# Patient Record
Sex: FEMALE | Race: WHITE | URBAN - METROPOLITAN AREA
[De-identification: names, ages, dates, MRNs, and addresses within clinical notes are randomized per-mention and may not be internally consistent; named-entity substitution may affect disease eponyms.]

---

## 2017-12-05 ENCOUNTER — PATIENT MESSAGE (OUTPATIENT)
Dept: FAMILY MEDICINE CLINIC | Facility: CLINIC | Age: 27
End: 2017-12-05

## 2017-12-05 ENCOUNTER — LAB ENCOUNTER (OUTPATIENT)
Dept: LAB | Age: 27
End: 2017-12-05
Attending: FAMILY MEDICINE
Payer: COMMERCIAL

## 2017-12-05 ENCOUNTER — OFFICE VISIT (OUTPATIENT)
Dept: FAMILY MEDICINE CLINIC | Facility: CLINIC | Age: 27
End: 2017-12-05

## 2017-12-05 VITALS
BODY MASS INDEX: 25.89 KG/M2 | HEART RATE: 73 BPM | DIASTOLIC BLOOD PRESSURE: 88 MMHG | SYSTOLIC BLOOD PRESSURE: 135 MMHG | TEMPERATURE: 98 F | HEIGHT: 65 IN | RESPIRATION RATE: 14 BRPM | WEIGHT: 155.38 LBS

## 2017-12-05 DIAGNOSIS — Z01.419 WELL WOMAN EXAM WITH ROUTINE GYNECOLOGICAL EXAM: Primary | ICD-10-CM

## 2017-12-05 DIAGNOSIS — Z01.419 WELL WOMAN EXAM WITH ROUTINE GYNECOLOGICAL EXAM: ICD-10-CM

## 2017-12-05 DIAGNOSIS — Z30.011 ENCOUNTER FOR INITIAL PRESCRIPTION OF CONTRACEPTIVE PILLS: ICD-10-CM

## 2017-12-05 PROCEDURE — 86780 TREPONEMA PALLIDUM: CPT

## 2017-12-05 PROCEDURE — 87491 CHLMYD TRACH DNA AMP PROBE: CPT

## 2017-12-05 PROCEDURE — 87389 HIV-1 AG W/HIV-1&-2 AB AG IA: CPT

## 2017-12-05 PROCEDURE — 99395 PREV VISIT EST AGE 18-39: CPT | Performed by: FAMILY MEDICINE

## 2017-12-05 PROCEDURE — 85025 COMPLETE CBC W/AUTO DIFF WBC: CPT

## 2017-12-05 PROCEDURE — 80061 LIPID PANEL: CPT

## 2017-12-05 PROCEDURE — 87591 N.GONORRHOEAE DNA AMP PROB: CPT

## 2017-12-05 PROCEDURE — 36415 COLL VENOUS BLD VENIPUNCTURE: CPT

## 2017-12-05 PROCEDURE — 80053 COMPREHEN METABOLIC PANEL: CPT

## 2017-12-05 PROCEDURE — 84443 ASSAY THYROID STIM HORMONE: CPT

## 2017-12-05 RX ORDER — NORETHINDRONE ACETATE AND ETHINYL ESTRADIOL .03; 1.5 MG/1; MG/1
1 TABLET ORAL DAILY
Qty: 1 PACKAGE | Refills: 0 | Status: SHIPPED | OUTPATIENT
Start: 2017-12-05 | End: 2017-12-27

## 2017-12-05 NOTE — PROGRESS NOTES
HPI:    Quynh Pineda is a 28year old female presents to clinic for a well woman exam.   Past Medical history: none, no meds  Patient's last menstrual period was 11/15/2017. 3-4/28-32 day cycles, no concerns  Sexually Active?  Yes with 1 new partner, normal.   Mouth/Throat: Uvula is midline, oropharynx is clear and moist and mucous membranes are normal.   Eyes: Conjunctivae and EOM are normal. Pupils are equal, round, and reactive to light. Neck: Normal range of motion. Neck supple.  No thyromegaly pr 12/5/2017  Kenny Lewis MD

## 2017-12-06 NOTE — TELEPHONE ENCOUNTER
ISH message to pt sent with Dr Jorge Chaudhary recommendations to use backup method of birth control (condoms with spermicide) during the first 7 days of the pill package.  Birth control pills will start to protect you from pregnancy after the first week, if

## 2017-12-06 NOTE — TELEPHONE ENCOUNTER
From: Yasmin Patterson  To: Mamadou Noguera MD  Sent: 12/5/2017 10:55 AM CST  Subject: Prescription Question    Does the birth control go in effect right away? Or is it after a few days?

## 2017-12-06 NOTE — TELEPHONE ENCOUNTER
From: Viviana Mariscal  To: Jonas Mcmillan MD  Sent: 12/5/2017 9:55 PM CST  Subject: Non-Urgent Medical Question    How quickly does the birth control go into effect?

## 2017-12-27 RX ORDER — NORETHINDRONE ACETATE AND ETHINYL ESTRADIOL .03; 1.5 MG/1; MG/1
1 TABLET ORAL DAILY
Qty: 1 PACKAGE | Refills: 0 | Status: SHIPPED
Start: 2017-12-27 | End: 2018-01-18

## 2017-12-27 NOTE — TELEPHONE ENCOUNTER
Gynecology Medications please advise on contraceptive refill request. Protocol passed but notes from [address] states that pt was advised to quit smoking.      Please respond to pool: EM FM OPO LPN/CMA    Pt contacted (Name and  verified) and states yo

## 2018-01-19 RX ORDER — NORETHINDRONE ACETATE AND ETHINYL ESTRADIOL .03; 1.5 MG/1; MG/1
1 TABLET ORAL DAILY
Qty: 1 PACKAGE | Refills: 0
Start: 2018-01-19 | End: 2018-02-16

## 2018-01-20 RX ORDER — NORETHINDRONE ACETATE AND ETHINYL ESTRADIOL .03; 1.5 MG/1; MG/1
1 TABLET ORAL DAILY
Qty: 1 PACKAGE | Refills: 2 | Status: SHIPPED
Start: 2018-01-20 | End: 2018-02-17

## 2018-01-20 NOTE — TELEPHONE ENCOUNTER
From: Sergey Sabillon  Sent: 1/18/2018 5:30 AM CST  Subject: Medication Renewal Request    Sergey Sabillon would like a refill of the following medications:     Norethindrone Acet-Ethinyl Est (Sharmila Ebenezer 1.5/30) 1.5-30 MG-MCG Oral Tab Kay Carlson MD]    Preferred pharmacy: [facility]Y #[id number]. AT [place] [address] [address], [phone], [phone]

## 2018-04-16 RX ORDER — NORETHINDRONE ACETATE AND ETHINYL ESTRADIOL 1.5; 3 MG/1; UG/1
TABLET ORAL
Qty: 3 PACKAGE | Refills: 2 | Status: SHIPPED | OUTPATIENT
Start: 2018-04-16 | End: 2018-10-29

## 2018-10-29 RX ORDER — NORETHINDRONE ACETATE AND ETHINYL ESTRADIOL 1.5; 3 MG/1; UG/1
TABLET ORAL
Qty: 1 PACKAGE | Refills: 2 | Status: SHIPPED | OUTPATIENT
Start: 2018-10-29

## 2023-04-25 NOTE — TELEPHONE ENCOUNTER
From: Sara Cerna  Sent: 12/27/2017 5:50 AM CST  Subject: Medication Renewal Request    Sara Cerna would like a refill of the following medications:     Norethindrone Acet-Ethinyl Est (Carmel Meline 1.5/30) 1.5-30 MG-MCG Oral Tab PATRICIA Puckett Patient/Caregiver provided printed discharge information.